# Patient Record
Sex: MALE | Race: OTHER | HISPANIC OR LATINO | URBAN - METROPOLITAN AREA
[De-identification: names, ages, dates, MRNs, and addresses within clinical notes are randomized per-mention and may not be internally consistent; named-entity substitution may affect disease eponyms.]

---

## 2023-02-16 ENCOUNTER — EMERGENCY (EMERGENCY)
Facility: HOSPITAL | Age: 48
LOS: 1 days | Discharge: HOME | End: 2023-02-16
Admitting: EMERGENCY MEDICINE
Payer: COMMERCIAL

## 2023-02-16 VITALS
SYSTOLIC BLOOD PRESSURE: 103 MMHG | RESPIRATION RATE: 16 BRPM | HEART RATE: 85 BPM | WEIGHT: 227.96 LBS | OXYGEN SATURATION: 97 % | DIASTOLIC BLOOD PRESSURE: 68 MMHG | HEIGHT: 70 IN | TEMPERATURE: 98 F
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
## 2023-02-16 DIAGNOSIS — S51.011A LACERATION WITHOUT FOREIGN BODY OF RIGHT ELBOW, INITIAL ENCOUNTER: ICD-10-CM

## 2023-02-16 DIAGNOSIS — F17.200 NICOTINE DEPENDENCE, UNSPECIFIED, UNCOMPLICATED: ICD-10-CM
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
## 2023-02-16 DIAGNOSIS — Z87.09 PERSONAL HISTORY OF OTHER DISEASES OF THE RESPIRATORY SYSTEM: ICD-10-CM

## 2023-02-16 DIAGNOSIS — Z23 ENCOUNTER FOR IMMUNIZATION: ICD-10-CM

## 2023-02-16 PROCEDURE — 99285 EMERGENCY DEPT VISIT HI MDM: CPT

## 2023-02-16 PROCEDURE — 90715 TDAP VACCINE 7 YRS/> IM: CPT

## 2023-02-16 PROCEDURE — 73200 CT UPPER EXTREMITY W/O DYE: CPT | Mod: 26,RT,MG

## 2023-02-16 PROCEDURE — G1004: CPT

## 2023-02-16 PROCEDURE — 99284 EMERGENCY DEPT VISIT MOD MDM: CPT | Mod: 25

## 2023-02-16 PROCEDURE — 90471 IMMUNIZATION ADMIN: CPT

## 2023-02-16 PROCEDURE — 73200 CT UPPER EXTREMITY W/O DYE: CPT | Mod: MG

## 2023-02-16 PROCEDURE — 73080 X-RAY EXAM OF ELBOW: CPT

## 2023-02-16 PROCEDURE — 73080 X-RAY EXAM OF ELBOW: CPT | Mod: 26,RT

## 2023-02-16 PROCEDURE — 12001 RPR S/N/AX/GEN/TRNK 2.5CM/<: CPT

## 2023-02-16 RX ORDER — TETANUS TOXOID, REDUCED DIPHTHERIA TOXOID AND ACELLULAR PERTUSSIS VACCINE, ADSORBED 5; 2.5; 8; 8; 2.5 [IU]/.5ML; [IU]/.5ML; UG/.5ML; UG/.5ML; UG/.5ML
0.5 SUSPENSION INTRAMUSCULAR ONCE
Refills: 0 | Status: COMPLETED | OUTPATIENT
Start: 2023-02-16 | End: 2023-02-16

## 2023-02-16 RX ADMIN — TETANUS TOXOID, REDUCED DIPHTHERIA TOXOID AND ACELLULAR PERTUSSIS VACCINE, ADSORBED 0.5 MILLILITER(S): 5; 2.5; 8; 8; 2.5 SUSPENSION INTRAMUSCULAR at 13:25

## 2023-02-16 NOTE — ED PROVIDER NOTE - OBJECTIVE STATEMENT
47-year-old male complaining of right upper extremity injury.  [place] his right elbow and forearm.  Sustained a laceration to the elbow.  Complains of pain at the site of laceration at the olecranon.  No mild discomfort with feeling of right shoulder and right wrist.  He denies striking his head.  No neck or back pain.  No chest pain or dyspnea.  No abdominal pain.  No other complaints.  Has no paresthesias in the hand or fingers.  Unknown last tetanus shot.    History of childhood asthma.  And asthma attacks since childhood.  Never hospitalized or intubated for asthma.  Takes no asthma medications.

## 2023-02-16 NOTE — ED ADULT NURSE NOTE - NSIMPLEMENTINTERV_GEN_ALL_ED
Implemented All Fall Risk Interventions:  [place] to call system. Call bell, personal items and telephone within reach. Instruct patient to call for assistance. Room bathroom lighting operational. Non-slip footwear when patient is off stretcher. Physically safe environment: no spills, clutter or unnecessary equipment. Stretcher in lowest position, wheels locked, appropriate side rails in place. Provide visual cue, wrist band, yellow gown, etc. Monitor gait and stability. Monitor for mental status changes and reorient to person, place, and time. Review medications for side effects contributing to fall risk. Reinforce activity limits and safety measures with patient and family.

## 2023-02-16 NOTE — ED ADULT TRIAGE NOTE - CHIEF COMPLAINT QUOTE
Pt s/p mechanical trip and fall co R elbow laceration. No active bleeding. Denies head injury or LOC, no AC use. unknown last tetanus.

## 2023-02-16 NOTE — ED PROVIDER NOTE - CLINICAL SUMMARY MEDICAL DECISION MAKING FREE TEXT BOX
He does fall directly onto her right elbow.  Sustained laceration to olecranon, was wearing a jacket and patient states no debris got into the wound.  No evidence of foreign debris careful inspection in a bloodless field.  No tenderness to shoulder upper arm forearm wrist or hand.  X-ray of the elbow showed lucency of distal forearm though there was no point tenderness at this location.  Discussed with radiologist who recommended CT scan to ensure no distal humerus fracture.  NVI.  No head or neck injuries or other suspicion for cervical injury.  No evidence of thoracoabdominal injuries. Will update tetanus and repair wound.  If there is no fracture found on CT scan there is no indication for antibiotics.  History of asthma, not having any respiratory difficulties or evidence of an asthma exacerbation.

## 2023-02-16 NOTE — ED PROVIDER NOTE - PROGRESS NOTE DETAILS
Discussed with radiology resident, cannot rule out fracture (vs nutrient vessel).  Recommending CT scan.

## 2023-02-16 NOTE — ED PROVIDER NOTE - PATIENT PORTAL LINK FT
You can access the FollowMyHealth Patient Portal offered by Health system by registering at the following website: [email or web address]. By joining [facility]’s FollowMyHealth portal, you will also be able to view your health information using other applications (apps) compatible with our system.

## 2023-02-16 NOTE — ED PROVIDER NOTE - NSFOLLOWUPINSTRUCTIONS_ED_ALL_ED_FT
Quit smoking. Quit smoking.  Keep dressing clean and dry.  After 48 hours, remove the bandages, wash with warm, soapy water, and pat dry.  Apply bacitracin ointment and a fresh sterile dressing and bandage.  Do this once or twice each day.    The sutures should be removed in 10-12 days.  You can come to the ER for this.    Return to the Emergency Department if you have any new or worsening symptoms, or if you have any concerns.  ---------------------  Dejar de fumar.  Mantenga el vendaje limpio y seco. Después de 48 horas, retire los vendajes, lave con agua tibia y jabón y seque. Aplique un ungüento de bacitracina y un apósito y vendaje estériles nuevos. [place]nn esto lily o dos veces al día.  Las suturas deben retirarse en 10-12 días. Puede venir a la jean de emergencias para esto.    Regrese al Departamento de Emergencias si tiene síntomas nuevos o que empeoran, o si tiene alguna inquietud.  ====================    Cuidado de emmett puntos de sutura    LO QUE NECESITA SABER:    Los puntos de sutura o puntadas se usan para cerrar las cortadas y heridas en la piel. Los puntos de sutura deben ser removidos lily vez que la herida haya sanado.           INSTRUCCIONES SOBRE EL NOEMÍ HOSPITALARIA:    Regrese a la jean de emergencias si:  •Se desprenden los puntos de sutura.      •La kay empapa emmett vendajes.      •Usted repentinamente no puede  la articulación que se lesionó.      •Usted presenta entumecimiento repentino alrededor de martinez herida.      •Usted nota líneas gaines que salen de martinez herida.      Comuníquese con martinez médico si:  •Usted tiene fiebre y escalofríos.      •Martinez herida se pone de color guillen, caliente, se inflama o supura pus.      •Martinez herida tiene mal olor.      •Usted siente más dolor en el área de la herida.      •Usted tiene preguntas o inquietudes acerca de martinez condición o cuidado.      Cuidado de los puntos de sutura:  •Proteja los puntos de sutura.Es posible que deba cubrirlos con un vendaje nik 24 a 48 horas, o harvey le indiquen. No golpee la jessa de sutura. [place] podría abrir la herida. No tana los extremos de emmett puntos de sutura. Si están rozando con martinez ropa, coloque un vendaje de gasa entre los puntos de sutura y martinez ropa.      •Limpie el área harvey se le indique.Lávese cuidadosamente la herida con agua y jabón. Para las heridas de martinez boca o labios, enjuáguese la boca después de las comidas y antes de acostarse. Pregunte a martinez médico con qué se debe enjuagar la boca. Si la herida se encuentra en martinez cuero cabelludo, lávese el mindy cuidadosamente cada 2 días con un champú suave. No use productos para el mindy, harvey fijador. Revise la herida en busca de signos de infección cuando la limpie. Estos signos incluyen enrojecimiento, inflamación y pus.      •Mantenga el área seca harvey se le haya indicado.Espere de 12 a 24 horas después de recibir emmett puntos de sutura para bañarse. [place] lily ducha en vez de bañarse. No tome gabe de durga ni nade. Martinez médico le dará instrucciones para bañarse con los puntos de sutura.      Ayude a que martinez herida sane:  •Eleve el área de la herida.Mantenga la herida por encima del nivel del corazón tan a menudo harvey pueda. [place] va a disminuir inflamación y el dolor. Si es posible, apoye la jessa sobre almohadas o mantas para mantenerla elevada cómodamente.      •Limite la actividad.No estire la piel alrededor de martinez herida. [place] ayudará a prevenir el sangrado y la inflamación.      Acuda a emmett consultas de control con martinez médico según le indicaron.Es posible que usted necesite regresar para que le quiten los puntos de sutura. [place] emmett preguntas para que se acuerde de hacerlas nik emmett visitas.

## 2023-02-16 NOTE — ED PROCEDURE NOTE - CPROC ED POST PROC CARE GUIDE1
Xeroform gauze and Kerlix dressing, ACE bandage/Verbal/written post procedure instructions were given to patient/caregiver./Instructed patient/caregiver to follow-up with primary care physician./Instructed patient/caregiver regarding signs and symptoms of infection./Keep the cast/splint/dressing clean and dry.

## 2023-02-16 NOTE — ED PROVIDER NOTE - PHYSICAL EXAMINATION
CONSTITUTIONAL: NAD   SKIN: Normal color and turgor.    HEAD: NC/AT.  EYES: Conjunctiva clear. EOMI. PERRL.    ENT: Airway clear. Normal voice.   RESPIRATORY:  Normal work of breathing. Lungs CTAB.  CARDIOVASCULAR:  RRR, S1S2. No M/R/G.      GI:  Abdomen soft, nontender.    MSK: Neck supple.  No midline neck tenderness. 2.5 cm laceration, skin depth, right olecranon.  No exposed bone. No foreign body/debris.  Tender over laceration right olecranon.  No joint swelling or ROM limitation.  No tenderness to humerus or forearm.  No wrist/snuffbox tenderness.  No swelling of upper  arm or forearm.  No swelling or tenderness of hand/fingers. All compartments soft. Strong radial pulse.  Cap refill brisk.    NEURO: Alert; CN: grossly intact. Speech clear.  REDDY. Gait steady.  Motor and sensation intact in MRU distributions.

## 2023-02-16 NOTE — ED ADULT NURSE NOTE - OBJECTIVE STATEMENT
Patient presents to the ED complaining of a fall today, landing on his R elbow. Patient noted to have a laceration to the elbow. No loc or head injury. Ambulatory with a steady gait. Tetanus not up to date.